# Patient Record
Sex: FEMALE | Race: BLACK OR AFRICAN AMERICAN | Employment: OTHER | URBAN - METROPOLITAN AREA
[De-identification: names, ages, dates, MRNs, and addresses within clinical notes are randomized per-mention and may not be internally consistent; named-entity substitution may affect disease eponyms.]

---

## 2017-01-11 ENCOUNTER — OFFICE VISIT (OUTPATIENT)
Dept: INTERNAL MEDICINE CLINIC | Age: 62
End: 2017-01-11

## 2017-01-11 VITALS
SYSTOLIC BLOOD PRESSURE: 126 MMHG | TEMPERATURE: 97.3 F | DIASTOLIC BLOOD PRESSURE: 75 MMHG | HEART RATE: 97 BPM | BODY MASS INDEX: 26.68 KG/M2 | RESPIRATION RATE: 18 BRPM | OXYGEN SATURATION: 98 % | WEIGHT: 145 LBS | HEIGHT: 62 IN

## 2017-01-11 DIAGNOSIS — E78.2 MIXED DYSLIPIDEMIA: ICD-10-CM

## 2017-01-11 DIAGNOSIS — Z78.9 STATIN INTOLERANCE: ICD-10-CM

## 2017-01-11 DIAGNOSIS — B37.81 THRUSH OF MOUTH AND ESOPHAGUS (HCC): Primary | ICD-10-CM

## 2017-01-11 DIAGNOSIS — M25.521 ELBOW PAIN, RIGHT: ICD-10-CM

## 2017-01-11 DIAGNOSIS — Z87.891 FORMER SMOKER: ICD-10-CM

## 2017-01-11 DIAGNOSIS — I10 BENIGN ESSENTIAL HTN: ICD-10-CM
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
## 2017-01-11 DIAGNOSIS — B37.0 THRUSH OF MOUTH AND ESOPHAGUS (HCC): Primary | ICD-10-CM

## 2017-01-11 DIAGNOSIS — F43.9 STRESS AT HOME: ICD-10-CM

## 2017-01-11 RX ORDER — NYSTATIN 100000 [USP'U]/ML
SUSPENSION ORAL
Refills: 0 | COMMUNITY
Start: 2016-12-29 | End: 2017-01-26 | Stop reason: ALTCHOICE

## 2017-01-11 RX ORDER — FLUCONAZOLE 150 MG/1
150 TABLET ORAL DAILY
Qty: 5 TAB | Refills: 0 | Status: SHIPPED | OUTPATIENT
Start: 2017-01-11 | End: 2017-01-12

## 2017-01-11 NOTE — MR AVS SNAPSHOT
Visit Information Date & Time Provider Department Dept. Phone Encounter #  
 1/11/2017 10:45  [facility] Eakly, MD [address] [id number] Follow-up Instructions Return for 1-2 weeks review labs ? praulent rx f/up esophagus 15 min please . Upcoming Health Maintenance Date Due Hepatitis C Screening 1955 Pneumococcal 19-64 Medium Risk (1 of 1 - PPSV23) 9/29/1976 FOBT Q 1 YEAR AGE 50-75 9/29/2007 BREAST CANCER SCRN MAMMOGRAM 6/3/2011 INFLUENZA AGE 9 TO ADULT 8/1/2016 PAP AKA CERVICAL CYTOLOGY 8/4/2019 DTaP/Tdap/Td series (2 - Td) 8/2/2025 Allergies as of 1/11/2017  Review Complete On: 1/11/2017 By: [address] [facility] E[facility], MD  
  
 Severity Noted Reaction Type Reactions Atorvastatin  08/02/2016    Other (comments) Severe myalgias Livalo [Pitavastatin]  01/11/2017    Rash All over body throat felt full Mobic [Meloxicam]  08/02/2016    Nausea and Vomiting Dizziness Current Immunizations  Reviewed on 8/2/2016 Name Date Tdap 8/2/2015 Not reviewed this visit You Were Diagnosed With   
  
 Codes Comments Thrush of mouth and esophagus ([facility] [address]    -  Primary ICD-10-CM: B37.81, B37.0 ICD-9-CM: 112.84, 112.0 Elbow pain, right     ICD-10-CM: M25.521 ICD-9-CM: 719.42 Fall, initial encounter     ICD-10-CM: W19. Gilmar Farnsworth ICD-9-CM: E888.9 Stress at home     ICD-10-CM: F43.9 ICD-9-CM: V61.9 Former smoker     ICD-10-CM: Z04.637 ICD-9-CM: V15.82 Benign essential HTN     ICD-10-CM: I10 
ICD-9-CM: 401.1 Statin intolerance     ICD-10-CM: Z78.9 ICD-9-CM: 995.27 Mixed dyslipidemia     ICD-10-CM: E78.2 ICD-9-CM: 272.2 Vitals BP Pulse Temp Resp Height(growth percentile) Weight(growth percentile) 126/75 (BP 1 Location: Left arm, BP Patient Position: Sitting) 97 97.3 °F (36.3 °C) (Oral) 18 5' 2\" (1.575 m) 145 lb (65.8 kg) SpO2 BMI OB Status Smoking Status 98% 26.52 kg/m2 Postmenopausal Former Smoker Vitals History BMI and BSA Data Body Mass Index Body Surface Area  
 26.52 kg/m 2 1.7 m 2 Preferred Pharmacy Pharmacy Name Phone [facility]PHARMACY #[id number] [address] [facility] [phone] Your Updated Medication List  
  
   
This list is accurate as of: 1/11/17 11:42 AM.  Always use your most recent med list. amLODIPine 10 mg tablet Commonly known as:  Ho Arley TAKE 1 TABLET BY MOUTH DAILY  
  
 cetirizine 10 mg tablet Commonly known as:  ZYRTEC Take  by mouth.  
  
 cloNIDine HCl 0.1 mg tablet Commonly known as:  CATAPRES Take 1 Tab by mouth nightly. Indications: VASOMOTOR SYMPTOMS ASSOCIATED WITH MENOPAUSE  
  
 diazePAM 5 mg tablet Commonly known as:  VALIUM Take 5 mg by mouth every six (6) hours as needed for Anxiety. fluconazole 150 mg tablet Commonly known as:  DIFLUCAN Take 1 Tab by mouth daily for 1 day. FDA advises cautious prescribing of oral fluconazole in pregnancy. HYDROcodone-ibuprofen 7.5-200 mg per tablet Commonly known as:  Yessi Lisseth Take  by mouth.  
  
 lovastatin 10 mg tablet Commonly known as:  MEVACOR Take 1 Tab by mouth nightly. meloxicam 15 mg tablet Commonly known as:  MOBIC  
  
 methocarbamol 750 mg tablet Commonly known as:  ROBAXIN  
  
 mometasone 50 mcg/actuation nasal spray Commonly known as:  NASONEX  
2 Sprays by Both Nostrils route daily. nystatin 100,000 unit/mL suspension Commonly known as:  MYCOSTATIN  
TK 5 ML PO FOUR TIMES DAILY FOR 7 DAYS  
  
 VITAMIN D2 50,000 unit capsule Generic drug:  ergocalciferol Take 50,000 Units by mouth. 2 capsules once a week VITAMIN D3 5,000 unit Tab tablet Generic drug:  cholecalciferol (VITAMIN D3) Take  by mouth daily. Prescriptions Sent to Pharmacy Refills fluconazole (DIFLUCAN) 150 mg tablet 0 Sig: Take 1 Tab by mouth daily for 1 day. FDA advises cautious prescribing of oral fluconazole in pregnancy. Class: Normal  
 Pharmacy: [facility]/pharmacy #[id number] [id number]  [facility] AT [address] [place] [address] #: [phone] Route: Oral  
  
We Performed the Following LDL, DIRECT D2300824 CPT(R)] REFERRAL TO GASTROENTEROLOGY [RDS40 Custom] Comments:  
 Please evaluate patient for candidiasis of the esophagus Follow-up Instructions Return for 1-2 weeks review labs ? praulent rx f/up esophagus 15 min please . Referral Information Referral ID Referred By Referred To  
  
 [id number] Julien Morocho, [address] [facility] [address] [place], [address] [facility] Visits Status Start Date End Date 1 New Request 1/11/17 1/11/18 If your referral has a status of pending review or denied, additional information will be sent to support the outcome of this decision. Patient Instructions What to Expect When You Call [facility]. How may we pray with you?  When you call [facility] at 1-800-NOW-PRAY, any time night or day, these are the first words you will hear. The phone will be answered by a trained prayer ministry associate who will ask for your first name and the name(s) of anyone for whom you are requesting prayer. The choice is yours whether you share these names. Rest assured, the information you provide is kept strictly confidential. 
Levon Trejo can then describe your situation and what kind of prayer you would like. You might want to ask for a prayer for healing  or prosperity  or a new job  or inner peace  or guidance  or a harmonious relationship  or to express brett and gratitude. There Are No Limits to Prayer No prayer request is too big or too small. Some people call when theyve lost a loved one; others call when theyve misplaced their keys!  Even if you are not sure what to pray for, the telephone prayer ministry associate will listen with love and compassion and then pray with you about your situation. You need only listen and take it in. After we have prayed with you we then place your prayer request in the [address] [place] [address] for 30 days of continuous prayer. The associate will offer to send you a personal letter (or email) for additional prayer support. If you say yes, you will be asked your full name and mailing address or email address. You are in no way obligated to receive a follow-up letter, but most people find it very comforting and a good reminder that you are never alone. If you have requested a prayer on behalf of someone else, you may also request that a letter of prayer support be sent to them, in which case you need to provide their mailing or email address. This prayer ministry is available to you for no charge, and you may call or contact [employer] as often as you like. Give [employer] a Call Sometimes life circumstances can feel overwhelming. Even though God is with you in every moment, it can be very comforting and reassuring to be reminded of that truth, and to hear a loving voice offer prayer support. [facility] [employer] is always here for you. What one heart cannot bear alone, many hearts can bear together.  This is [facility] [place]. How may we pray with you? Introducing [facility]! Anibal Carmen introduces [employer] patient portal. Now you can access parts of your medical record, email your doctor's office, and request medication refills online. 1. In your internet browser, go to [email or web address] 2. Click on the First Time User? Click Here link in the Sign In box. You will see the New Member Sign Up page. 3. Enter your [employer] Access Code exactly as it appears below. You will not need to use this code after youve completed the sign-up process.  If you do not sign up before the expiration date, you must request a new code. · [employer] Access Code: [email or web address] Expires: 4/11/2017 11:42 AM 
 
4. Enter the last four digits of your Social Security Number (xxxx) and Date of Birth (mm/dd/yyyy) as indicated and click Submit. You will be taken to the next sign-up page. 5. Create a [employer] ID. This will be your [employer] login ID and cannot be changed, so think of one that is secure and easy to remember. 6. Create a [employer] password. You can change your password at any time. 7. Enter your Password Reset Question and Answer. This can be used at a later time if you forget your password. 8. Enter your e-mail address. You will receive e-mail notification when new information is available in [address] 9. Click Sign Up. You can now view and download portions of your medical record. 10. Click the Download Summary menu link to download a portable copy of your medical information. If you have questions, please visit the Frequently Asked Questions section of the [employer] website. Remember, [employer] is NOT to be used for urgent needs. For medical emergencies, dial 911. Now available from your iPhone and Android! Please provide this summary of care documentation to your next provider. Your primary care clinician is listed as Larry Lugo. If you have any questions after today's visit, please call [phone].

## 2017-01-11 NOTE — PROGRESS NOTES
Chief Complaint   Patient presents with    Hypertension       Subjective:   Spenser Valenzuela 63 y.o.  female with a  past medical history reviewed see below. comes in today for f/up HTN. Pt had thrush and went to the er and felt much better but still feels like something is in her hroat and the sensation never went totally away   She was treated with nystatin but tnot a pill   She feel this am as her pipes burst and her heat went out. And she was taking a shower before they fixed her pipes and she slipped and feel on her floor and injrued her right elbow     ROS: otherwise feeling generally well. All other systems reviewed and are negative    Current Outpatient Prescriptions   Medication Sig Dispense Refill    nystatin (MYCOSTATIN) 100,000 unit/mL suspension TK 5 ML PO FOUR TIMES DAILY FOR 7 DAYS  0    amLODIPine (NORVASC) 10 mg tablet TAKE 1 TABLET BY MOUTH DAILY 30 Tab 3    cholecalciferol, VITAMIN D3, (VITAMIN D3) 5,000 unit tab tablet Take  by mouth daily.  cetirizine (ZYRTEC) 10 mg tablet Take  by mouth.  cloNIDine HCl (CATAPRES) 0.1 mg tablet Take 1 Tab by mouth nightly. Indications: VASOMOTOR SYMPTOMS ASSOCIATED WITH MENOPAUSE 30 Tab 3    mometasone (NASONEX) 50 mcg/actuation nasal spray 2 Sprays by Both Nostrils route daily. 17 Container 0    lovastatin (MEVACOR) 10 mg tablet Take 1 Tab by mouth nightly. 30 Tab 0    meloxicam (MOBIC) 15 mg tablet   5    methocarbamol (ROBAXIN) 750 mg tablet   3    VITAMIN D2 50,000 unit capsule Take 50,000 Units by mouth. 2 capsules once a week  1    HYDROcodone-ibuprofen (VICOPROFEN) 7.5-200 mg per tablet Take  by mouth.  diazepam (VALIUM) 5 mg tablet Take 5 mg by mouth every six (6) hours as needed for Anxiety.        Allergies   Allergen Reactions    Atorvastatin Other (comments)     Severe myalgias     Livalo [Pitavastatin] Rash     All over body throat felt full    Mobic [Meloxicam] Nausea and Vomiting     Dizziness      Past Medical History Diagnosis Date    Hypercholesterolemia     Hypertension     Sciatica     TMJ (dislocation of temporomandibular joint)      Past Surgical History   Procedure Laterality Date    Hx colonoscopy  2011     7 yr h/o plyps      Family History   Problem Relation Age of Onset   [address] [facility] [address] Stroke Mother     Hypertension Mother    [facility] Elevated Lipids Father     Hypertension Father     Heart Disease Father 48    Hypertension Brother      Social History   Substance Use Topics    Smoking status: Former Smoker     Quit date: 12/28/2016    Smokeless tobacco: Never Used    Alcohol use Yes      Comment: rarely          Objective:     Visit Vitals    /75 (BP 1 Location: Left arm, BP Patient Position: Sitting)    Pulse 97    Temp 97.3 °F (36.3 °C) (Oral)    Resp 18    Ht 5' 2\" (1.575 m)    Wt 145 lb (65.8 kg)    SpO2 98%    BMI 26.52 kg/m2     Gen: NAD, pleasant  HEENT: normal appearing head, nares patent, PERRLA, EOMI, oropharynx no erythema, no cervical lymphadenopathy neck supple thrush of mouth  Cardio: RRR nl S1S2 no murmur  Lungs CTAB no wheeze no rales no rhonchi  ABD Soft non tender non distended + bowel sounds  Extremities: full ROM X 4 no clubbing no cyanosis right elbow mild tenderness no concern for fracture. rom limited by pain but full   Neuro: no gross focal deficits noted, alert and orientated X 3  Psych.: well groomed no outward signs of depression. Assessment/Plan:   Constance Angel was seen today for hypertension. Diagnoses and all orders for this visit:    Valaria Carrizales of mouth and esophagus ([address]  -     REFERRAL TO GASTROENTEROLOGY    Elbow pain, right    Fall, initial encounter    Stress at home    Former smoker    Benign essential HTN  -     LDL, DIRECT    Statin intolerance  -     LDL, DIRECT    Mixed dyslipidemia  -     LDL, DIRECT    Other orders  -     fluconazole (DIFLUCAN) 150 mg tablet; Take 1 Tab by mouth daily for 1 day.  FDA advises cautious prescribing of oral fluconazole in pregnancy. Follow-up Disposition:  Return for 1-2 weeks review labs ? praulent rx f/up esophagus 15 min please . avs printed and given to the pt. The patient voiced understanding of the above. Medication side effects were reviewed with the patient. Call with any concerns.

## 2017-01-11 NOTE — PATIENT INSTRUCTIONS
What to Expect When You Call    [facility]. How may we pray with you?   When you call [facility] at 1-800-NOW-PRAY, any time night or day, these are the first words you will hear. The phone will be answered by a trained prayer ministry associate who will ask for your first name and the name(s) of anyone for whom you are requesting prayer. The choice is yours whether you share these names. Rest assured, the information you provide is kept strictly confidential.  Levon Sarahias can then describe your situation and what kind of prayer you would like. You might want to ask for a prayer for healing  or prosperity  or a new job  or inner peace  or guidance  or a harmonious relationship  or to express brett and gratitude. There Are No Limits to Prayer  No prayer request is too big or too small. Some people call when theyve lost a loved one; others call when theyve misplaced their keys! Even if you are not sure what to pray for, the telephone prayer ministry associate will listen with love and compassion and then pray with you about your situation. You need only listen and take it in. After we have prayed with you we then place your prayer request in the [address] [place] [address] for 30 days of continuous prayer. The associate will offer to send you a personal letter (or email) for additional prayer support. If you say yes, you will be asked your full name and mailing address or email address. You are in no way obligated to receive a follow-up letter, but most people find it very comforting and a good reminder that you are never alone. If you have requested a prayer on behalf of someone else, you may also request that a letter of prayer support be sent to them, in which case you need to provide their mailing or email address. This prayer ministry is available to you for no charge, and you may call or contact [employer] as often as you like.   Give [facility] a Call  Sometimes life circumstances can feel overwhelming. Even though God is with you in every moment, it can be very comforting and reassuring to be reminded of that truth, and to hear a loving voice offer prayer support. [facility] [place] is always here for you. What one heart cannot bear alone, many hearts can bear together.   This is [facility] [place]. How may we pray with you?

## 2017-01-12 LAB — LDLC SERPL DIRECT ASSAY-MCNC: 112 MG/DL (ref 0–99)

## 2017-01-26 ENCOUNTER — OFFICE VISIT (OUTPATIENT)
Dept: INTERNAL MEDICINE CLINIC | Age: 62
End: 2017-01-26

## 2017-01-26 VITALS
HEIGHT: 62 IN | BODY MASS INDEX: 26.46 KG/M2 | HEART RATE: 102 BPM | DIASTOLIC BLOOD PRESSURE: 67 MMHG | WEIGHT: 143.8 LBS | SYSTOLIC BLOOD PRESSURE: 114 MMHG | OXYGEN SATURATION: 96 % | TEMPERATURE: 98 F | RESPIRATION RATE: 14 BRPM

## 2017-01-26 DIAGNOSIS — Z71.2 ENCOUNTER TO DISCUSS TEST RESULTS: ICD-10-CM

## 2017-01-26 DIAGNOSIS — Z09 HOSPITAL DISCHARGE FOLLOW-UP: Primary | ICD-10-CM

## 2017-01-26 DIAGNOSIS — R00.0 TACHYCARDIA: ICD-10-CM

## 2017-01-26 DIAGNOSIS — F43.29 STRESS AND ADJUSTMENT REACTION: ICD-10-CM

## 2017-01-26 DIAGNOSIS — Z63.4 DEATH OF RELATIVE: ICD-10-CM

## 2017-01-26 DIAGNOSIS — Z78.9 STATIN INTOLERANCE: ICD-10-CM

## 2017-01-26 DIAGNOSIS — R93.89 ABNORMAL CT SCAN: ICD-10-CM

## 2017-01-26 RX ORDER — LORAZEPAM 1 MG/1
TABLET ORAL
Qty: 14 TAB | Refills: 0 | Status: SHIPPED | OUTPATIENT
Start: 2017-01-26

## 2017-01-26 RX ORDER — LOVASTATIN 10 MG/1
10 TABLET ORAL
Qty: 30 TAB | Refills: 3 | Status: SHIPPED | OUTPATIENT
Start: 2017-01-26 | End: 2017-02-03 | Stop reason: SDUPTHER

## 2017-01-26 SDOH — SOCIAL STABILITY - SOCIAL INSECURITY: DISSAPEARANCE AND DEATH OF FAMILY MEMBER: Z63.4

## 2017-01-26 NOTE — PROGRESS NOTES
Chief Complaint   Patient presents with    Results     follow up from last visit and labs   [facility] Follow Up     follow up from [facility] for pneumonia. Subjective:   Colonel Paez 63 y.o.  female with a  past medical history reviewed see below. Here for f/up of of hypercholesterolemia   She was recently in the er for respiratory distress it was worse dx with CAP  incidental lung nodules she stopped smoking dec 28th was smoking 10 yrs approx. 8 pack yrs  She had a ct scan lungs and abd reviewed a hepatic mass and a adrenal mass she has a mri set up for the 31st at [facility]  She has been very stressed and buried her childhood girlfriend she is not sleeping. She took the medication for the thrush and feels better no longer has a sensation in her throat     ROS: otherwise feeling generally well. All other systems reviewed and are negative    Current Outpatient Prescriptions   Medication Sig Dispense Refill    lovastatin (MEVACOR) 10 mg tablet Take 1 Tab by mouth nightly. 30 Tab 3    LORazepam (ATIVAN) 1 mg tablet Start with a 1/2 tab use up to TID as needed 14 Tab 0    amLODIPine (NORVASC) 10 mg tablet TAKE 1 TABLET BY MOUTH DAILY 30 Tab 3    cholecalciferol, VITAMIN D3, (VITAMIN D3) 5,000 unit tab tablet Take  by mouth daily.  VITAMIN D2 50,000 unit capsule Take 50,000 Units by mouth. 2 capsules once a week  1    cetirizine (ZYRTEC) 10 mg tablet Take  by mouth.  cloNIDine HCl (CATAPRES) 0.1 mg tablet Take 1 Tab by mouth nightly. Indications: VASOMOTOR SYMPTOMS ASSOCIATED WITH MENOPAUSE 30 Tab 3    mometasone (NASONEX) 50 mcg/actuation nasal spray 2 Sprays by Both Nostrils route daily. 17 Container 0    meloxicam (MOBIC) 15 mg tablet   5    methocarbamol (ROBAXIN) 750 mg tablet   3    HYDROcodone-ibuprofen (VICOPROFEN) 7.5-200 mg per tablet Take  by mouth.        Allergies   Allergen Reactions    Atorvastatin Other (comments)     Severe myalgias     Livalo [Pitavastatin] Rash     All over body throat felt full    Mobic [Meloxicam] Nausea and Vomiting     Dizziness      Past Medical History   Diagnosis Date    Hypercholesterolemia     Hypertension     Sciatica     TMJ (dislocation of temporomandibular joint)      Past Surgical History   Procedure Laterality Date    Hx colonoscopy  2011     7 yr h/o plyps      Family History   Problem Relation Age of Onset   [place] Stroke Mother     Hypertension Mother    [employer] [place] Elevated Lipids Father     Hypertension Father     Heart Disease Father 48    Hypertension Brother      Social History   Substance Use Topics    Smoking status: Former Smoker     Quit date: 12/28/2016    Smokeless tobacco: Never Used    Alcohol use Yes      Comment: rarely       Objective:     Visit Vitals    /67 (BP 1 Location: Left arm, BP Patient Position: At rest)    Pulse (!) 102    Temp 98 °F (36.7 °C) (Oral)    Resp 14    Ht 5' 2\" (1.575 m)    Wt 143 lb 12.8 oz (65.2 kg)    SpO2 96%    BMI 26.3 kg/m2     Gen: NAD, pleasant  HEENT: normal appearing head, nares patent, PERRLA, EOMI, oropharynx no erythema, no cervical lymphadenopathy neck supple   Cardio: RRR nl S1S2 no murmur  Lungs CTAB no wheeze no rales no rhonchi  ABD Soft non tender non distended + bowel sounds  Extremities: full ROM X 4 no clubbing no cyanosis  Neuro: no gross focal deficits noted, alert and orientated X 3  Psych.: well groomed no outward signs of depression. Assessment/Plan:   Javier Acosta was seen today for results and hospital follow up. Diagnoses and all orders for this visit:    Hospital discharge follow-up    Tachycardia    Abnormal CT scan- adrenal mass hepatic mass     Stress and adjustment reaction    Encounter to discuss test results    Death of relative    Statin intolerance    Other orders  -     lovastatin (MEVACOR) 10 mg tablet; Take 1 Tab by mouth nightly.   -     LORazepam (ATIVAN) 1 mg tablet; Start with a 1/2 tab use up to TID as needed    Follow-up Disposition:  Return in about 7 days (around 2/2/2017) for review mri results 15 min . Mabeline Sink avs printed and given to the pt. .  Will have pt f/up on the 2nd for results of MCV restart statin continue to not smokeno need for praulent diet changes helped continue cv exercise   The patient voiced understanding of the above. Medication side effects were reviewed with the patient. Call with any concerns.

## 2017-01-26 NOTE — MR AVS SNAPSHOT
Visit Information Date & Time Provider Department Dept. Phone Encounter #  
 1/26/2017  2:45  [facility] [place] [phone] [id number] Follow-up Instructions Return in about 7 days (around 2/2/2017) for review mri results 15 min . Routing History Upcoming Health Maintenance Date Due Hepatitis C Screening 1955 FOBT Q 1 YEAR AGE 50-75 9/29/2007 BREAST CANCER SCRN MAMMOGRAM 6/3/2011 PAP AKA CERVICAL CYTOLOGY 8/4/2019 DTaP/Tdap/Td series (2 - Td) 8/2/2025 Allergies as of 1/26/2017  Review Complete On: 1/26/2017 By: Karmen Ramos LPN Severity Noted Reaction Type Reactions Atorvastatin  08/02/2016    Other (comments) Severe myalgias Livalo [Pitavastatin]  01/11/2017    Rash All over body throat felt full Mobic [Meloxicam]  08/02/2016    Nausea and Vomiting Dizziness Current Immunizations  Reviewed on 8/2/2016 Name Date Tdap 8/2/2015 Not reviewed this visit You Were Diagnosed With   
  
 Codes Comments Hospital discharge follow-up    -  Primary ICD-10-CM: [address] ICD-9-CM: V67.59 Tachycardia     ICD-10-CM: R00.0 ICD-9-CM: 785.0 Abnormal CT scan     ICD-10-CM: R93.8 ICD-9-CM: 793.99 Stress and adjustment reaction     ICD-10-CM: F43.29 ICD-9-CM: 309.89 Encounter to discuss test results     ICD-10-CM: Z71.89 ICD-9-CM: V65.49 Death of relative     ICD-10-CM: Z63.4 ICD-9-CM: V61.07 Statin intolerance     ICD-10-CM: Z78.9 ICD-9-CM: 995.27 Vitals BP Pulse Temp Resp Height(growth percentile) Weight(growth percentile) 114/67 (BP 1 Location: Left arm, BP Patient Position: At rest) (!) 102 98 °F (36.7 °C) (Oral) 14 5' 2\" (1.575 m) 143 lb 12.8 oz (65.2 kg) SpO2 BMI OB Status Smoking Status 96% 26.3 kg/m2 Postmenopausal Former Smoker BMI and BSA Data Body Mass Index Body Surface Area 26.3 kg/m 2 1.69 m 2 Preferred Pharmacy Pharmacy Name Phone [facility]/PHARMACY #[id number] [id number] [facility] AT [address] [place] [address] [phone] Your Updated Medication List  
  
   
This list is accurate as of: 1/26/17  3:12 PM.  Always use your most recent med list. amLODIPine 10 mg tablet Commonly known as:  Jane Rhonda TAKE 1 TABLET BY MOUTH DAILY  
  
 cetirizine 10 mg tablet Commonly known as:  ZYRTEC Take  by mouth.  
  
 cloNIDine HCl 0.1 mg tablet Commonly known as:  CATAPRES Take 1 Tab by mouth nightly. Indications: VASOMOTOR SYMPTOMS ASSOCIATED WITH MENOPAUSE HYDROcodone-ibuprofen 7.5-200 mg per tablet Commonly known as:  Linnette Duet Take  by mouth. LORazepam 1 mg tablet Commonly known as:  ATIVAN Start with a 1/2 tab use up to TID as needed  
  
 lovastatin 10 mg tablet Commonly known as:  MEVACOR Take 1 Tab by mouth nightly. meloxicam 15 mg tablet Commonly known as:  MOBIC  
  
 methocarbamol 750 mg tablet Commonly known as:  ROBAXIN  
  
 mometasone 50 mcg/actuation nasal spray Commonly known as:  NASONEX  
2 Sprays by Both Nostrils route daily. VITAMIN D2 50,000 unit capsule Generic drug:  ergocalciferol Take 50,000 Units by mouth. 2 capsules once a week VITAMIN D3 5,000 unit Tab tablet Generic drug:  cholecalciferol (VITAMIN D3) Take  by mouth daily. Prescriptions Printed Refills LORazepam (ATIVAN) 1 mg tablet 0 Sig: Start with a 1/2 tab use up to TID as needed Class: Print Prescriptions Sent to Pharmacy Refills  
 lovastatin (MEVACOR) 10 mg tablet 3 Sig: Take 1 Tab by mouth nightly. Class: Normal  
 Pharmacy: [facility]/pharmacy #[id number]- [place], [place] - [facility] AT [address] [place] [address] Ph #: [phone] Route: Oral  
  
Follow-up Instructions Return in about 7 days (around 2/2/2017) for review mri results 15 min . Patient Instructions Call with any concerns What to Expect When You Call [employer]. How may we pray with you?  When you call [employer] at 1-800-NOW-PRAY, any time night or day, these are the first words you will hear. The phone will be answered by a trained prayer ministry associate who will ask for your first name and the name(s) of anyone for whom you are requesting prayer. The choice is yours whether you share these names. Rest assured, the information you provide is kept strictly confidential. 
Mary Jo Hammer can then describe your situation and what kind of prayer you would like. You might want to ask for a prayer for healing  or prosperity  or a new job  or inner peace  or guidance  or a harmonious relationship  or to express brett and gratitude. There Are No Limits to Prayer No prayer request is too big or too small. Some people call when theyve lost a loved one; others call when theyve misplaced their keys! Even if you are not sure what to pray for, the telephone prayer ministry associate will listen with love and compassion and then pray with you about your situation. You need only listen and take it in. After we have prayed with you we then place your prayer request in the [address] [place] [address] for 30 days of continuous prayer. The associate will offer to send you a personal letter (or email) for additional prayer support. If you say yes, you will be asked your full name and mailing address or email address. You are in no way obligated to receive a follow-up letter, but most people find it very comforting and a good reminder that you are never alone. If you have requested a prayer on behalf of someone else, you may also request that a letter of prayer support be sent to them, in which case you need to provide their mailing or email address. This prayer ministry is available to you for no charge, and you may call or contact [employer] as often as you like. Give [facility] a Call Sometimes life circumstances can feel overwhelming. Even though God is with you in every moment, it can be very comforting and reassuring to be reminded of that truth, and to hear a loving voice offer prayer support. [facility] is always here for you. What one heart cannot bear alone, many hearts can bear together.  This is [employer]. How may we pray with you? Introducing [facility]! Yudith Nichole introduces [employer] patient portal. Now you can access parts of your medical record, email your doctor's office, and request medication refills online. 1. In your internet browser, go to [email or web address] 2. Click on the First Time User? Click Here link in the Sign In box. You will see the New Member Sign Up page. 3. Enter your [employer] Access Code exactly as it appears below. You will not need to use this code after youve completed the sign-up process. If you do not sign up before the expiration date, you must request a new code. · [employer] Access Code: [email or web address] Expires: 4/11/2017 11:42 AM 
 
4. Enter the last four digits of your Social Security Number (xxxx) and Date of Birth (mm/dd/yyyy) as indicated and click Submit. You will be taken to the next sign-up page. 5. Create a [employer] ID. This will be your [employer] login ID and cannot be changed, so think of one that is secure and easy to remember. 6. Create a [employer] password. You can change your password at any time. 7. Enter your Password Reset Question and Answer. This can be used at a later time if you forget your password. 8. Enter your e-mail address. You will receive e-mail notification when new information is available in [address] 9. Click Sign Up. You can now view and download portions of your medical record. 10. Click the Download Summary menu link to download a portable copy of your medical information. If you have questions, please visit the Frequently Asked Questions section of the [employer] website. Remember, [employer] is NOT to be used for urgent needs. For medical emergencies, dial 911. Now available from your iPhone and Android! Please provide this summary of care documentation to your next provider. Your primary care clinician is listed as Cole Fillers. If you have any questions after today's visit, please call [phone].

## 2017-01-26 NOTE — Clinical Note
On Thursday the 2nd of Feb  could you please print off her MRI results from [facility] will be done on the 31st of Jan

## 2017-01-26 NOTE — PATIENT INSTRUCTIONS
Call with any concerns     What to Expect When You Call    [employer]. How may we pray with you?   When you call [employer] at 1-800-NOW-PRAY, any time night or day, these are the first words you will hear. The phone will be answered by a trained prayer ministry associate who will ask for your first name and the name(s) of anyone for whom you are requesting prayer. The choice is yours whether you share these names. Rest assured, the information you provide is kept strictly confidential.  Roy Conrado can then describe your situation and what kind of prayer you would like. You might want to ask for a prayer for healing  or prosperity  or a new job  or inner peace  or guidance  or a harmonious relationship  or to express brett and gratitude. There Are No Limits to Prayer  No prayer request is too big or too small. Some people call when theyve lost a loved one; others call when theyve misplaced their keys! Even if you are not sure what to pray for, the telephone prayer ministry associate will listen with love and compassion and then pray with you about your situation. You need only listen and take it in. After we have prayed with you we then place your prayer request in the [address] [place] [address] for 30 days of continuous prayer. The associate will offer to send you a personal letter (or email) for additional prayer support. If you say yes, you will be asked your full name and mailing address or email address. You are in no way obligated to receive a follow-up letter, but most people find it very comforting and a good reminder that you are never alone. If you have requested a prayer on behalf of someone else, you may also request that a letter of prayer support be sent to them, in which case you need to provide their mailing or email address. This prayer ministry is available to you for no charge, and you may call or contact [employer] as often as you like.   Give [employer] a Call  Sometimes life circumstances can feel overwhelming. Even though God is with you in every moment, it can be very comforting and reassuring to be reminded of that truth, and to hear a loving voice offer prayer support. [facility] is always here for you. What one heart cannot bear alone, many hearts can bear together.   This is [employer]. How may we pray with you?

## 2017-01-26 NOTE — PROGRESS NOTES
1. Have you been to the ER, urgent care clinic since your last visit? Hospitalized since your last visit? Yes see below. 2. Have you seen or consulted any other health care providers outside of the [address] [place] [address] since your last visit? Include any pap smears or colon screening. No     Chief Complaint   Patient presents with    Results     follow up from last visit and labs   [facility] Follow Up     follow up from [facility] for pneumonia.       Not fasting

## 2017-02-03 ENCOUNTER — OFFICE VISIT (OUTPATIENT)
Dept: INTERNAL MEDICINE CLINIC | Age: 62
End: 2017-02-03

## 2017-02-03 VITALS
TEMPERATURE: 97.8 F | SYSTOLIC BLOOD PRESSURE: 118 MMHG | RESPIRATION RATE: 14 BRPM | HEIGHT: 62 IN | DIASTOLIC BLOOD PRESSURE: 78 MMHG | HEART RATE: 99 BPM | WEIGHT: 143 LBS | OXYGEN SATURATION: 88 % | BODY MASS INDEX: 26.31 KG/M2

## 2017-02-03 DIAGNOSIS — Z87.09 HISTORY OF EMPHYSEMA: ICD-10-CM

## 2017-02-03 DIAGNOSIS — R06.02 SOB (SHORTNESS OF BREATH): Primary | ICD-10-CM

## 2017-02-03 DIAGNOSIS — Z78.9 STATIN INTOLERANCE: ICD-10-CM

## 2017-02-03 DIAGNOSIS — D35.02 ADRENAL ADENOMA, LEFT: ICD-10-CM

## 2017-02-03 DIAGNOSIS — Z71.2 ENCOUNTER TO DISCUSS TEST RESULTS: ICD-10-CM

## 2017-02-03 DIAGNOSIS — E78.2 MIXED DYSLIPIDEMIA: ICD-10-CM

## 2017-02-03 PROBLEM — D35.00 ADRENAL ADENOMA: Status: ACTIVE | Noted: 2017-02-03

## 2017-02-03 LAB — SPIROMETRY INTERPRETATION, SPITPOCT: NORMAL

## 2017-02-03 RX ORDER — LEVALBUTEROL TARTRATE 45 UG/1
2 AEROSOL, METERED ORAL
Qty: 1 INHALER | Refills: 0 | Status: SHIPPED | OUTPATIENT
Start: 2017-02-03

## 2017-02-03 RX ORDER — LOVASTATIN 10 MG/1
20 TABLET ORAL
Qty: 30 TAB | Refills: 3
Start: 2017-02-03

## 2017-02-03 NOTE — PROGRESS NOTES
Chief Complaint   Patient presents with    Results     testing results           Subjective:   Shayy Lauren 63 y.o.  female with a  past medical history reviewed see below. comes in today for test results   She has decreased butter and and eating more fruits and veggies and stopped using house fat to cook. 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 ROS: otherwise feeling generally well. All other systems reviewed and are negative      Current Outpatient Prescriptions   Medication Sig Dispense Refill    lovastatin (MEVACOR) 10 mg tablet Take 1 Tab by mouth nightly. 30 Tab 3    LORazepam (ATIVAN) 1 mg tablet Start with a 1/2 tab use up to TID as needed 14 Tab 0    amLODIPine (NORVASC) 10 mg tablet TAKE 1 TABLET BY MOUTH DAILY 30 Tab 3    cloNIDine HCl (CATAPRES) 0.1 mg tablet Take 1 Tab by mouth nightly. Indications: VASOMOTOR SYMPTOMS ASSOCIATED WITH MENOPAUSE 30 Tab 3    cholecalciferol, VITAMIN D3, (VITAMIN D3) 5,000 unit tab tablet Take  by mouth daily.  mometasone (NASONEX) 50 mcg/actuation nasal spray 2 Sprays by Both Nostrils route daily. 17 Container 0    meloxicam (MOBIC) 15 mg tablet   5    methocarbamol (ROBAXIN) 750 mg tablet   3    VITAMIN D2 50,000 unit capsule Take 50,000 Units by mouth. 2 capsules once a week  1    HYDROcodone-ibuprofen (VICOPROFEN) 7.5-200 mg per tablet Take  by mouth.  cetirizine (ZYRTEC) 10 mg tablet Take  by mouth.        Allergies   Allergen Reactions    Atorvastatin Other (comments)     Severe myalgias     Livalo [Pitavastatin] Rash     All over body throat felt full    Mobic [Meloxicam] Nausea and Vomiting     Dizziness      Past Medical History   Diagnosis Date    Hypercholesterolemia     Hypertension     Sciatica     TMJ (dislocation of temporomandibular joint)      Past Surgical History   Procedure Laterality Date    Hx colonoscopy  2011     7 yr h/o plyps      Family History   Problem Relation Age of Onset   [facility] Stroke Mother     Hypertension Mother     Elevated Lipids Father     Hypertension Father     Heart Disease Father 48    Hypertension Brother      Social History   Substance Use Topics    Smoking status: Former Smoker     Quit date: 12/28/2016    Smokeless tobacco: Never Used    Alcohol use Yes      Comment: rarely          Objective:     Visit Vitals    /78    Pulse 99    Temp 97.8 °F (36.6 °C) (Oral)    Resp 14    Ht 5' 2\" (1.575 m)    Wt 143 lb (64.9 kg)    SpO2 (!) 88%    BMI 26.16 kg/m2     Gen: NAD, pleasant  HEENT: normal appearing head, nares patent, PERRLA, EOMI, oropharynx no erythema, no cervical lymphadenopathy neck supple   Cardio: RRR nl S1S2 no murmur  Lungs  diminsihed bs faint wheeze no rales no rhonchi  ABD Soft non tender non distended + bowel sounds  Extremities: full ROM X 4 no clubbing no cyanosis  Neuro: no gross focal deficits noted, alert and orientated X 3  Psych.: well groomed no outward signs of depression. Assessment/Plan:   Anabell Hicks was seen today for results. Diagnoses and all orders for this visit:    SOB (shortness of breath)  -     AMB POC SPIROMETRY REVIEW/INTERP    Mixed dyslipidemia    Statin intolerance    Adrenal adenoma, left    History of emphysema  -     AMB POC SPIROMETRY REVIEW/INTERP    Encounter to discuss test results    Other orders  -     lovastatin (MEVACOR) 10 mg tablet; Take 2 Tabs by mouth nightly. Dose increased 2/3/17 did not send in a new rx  -     levalbuterol tartrate (XOPENEX) 45 mcg/actuation inhaler; Take 2 Puffs by inhalation every four (4) hours as needed for Wheezing. Follow-up Disposition:  Return in about 3 months (around 5/3/2017) for recheck chol order ct scan  15 min . Benoit hughess printed and given to the pt. .  Pt has copd likely from asthma d/w pt need to inhaler if used >4-6 times a week will need to start control will try xopenex as she had side effects from the albuterol while hospitalized! conitnue statin tolerating this dose well will try to increase and if tolerated pt to let me know so I can send in a new rx for 20 mg   The patient voiced understanding of the above. Medication side effects were reviewed with the patient. Call with any concerns.

## 2017-02-03 NOTE — PATIENT INSTRUCTIONS
Please increase your mevacor to 20 mg  (take 2 of the current supply) if you start to have muscle aches hold for 2-3 days and then restart at 10 mg and let me know ibefore you refill your rx and I will send in a new rx for 20 mg if you able to tolerate it    MyChart Activation    Thank you for requesting access to [address] Please follow the instructions below to securely access and download your online medical record. [employer] allows you to send messages to your doctor, view your test results, renew your prescriptions, schedule appointments, and more. How Do I Sign Up? 1. In your internet browser, go to [email or web address]  2. Click on the First Time User? Click Here link in the Sign In box. You will be redirect to the New Member Sign Up page. 3. Enter your [employer] Access Code exactly as it appears below. You will not need to use this code after youve completed the sign-up process. If you do not sign up before the expiration date, you must request a new code. [employer] Access Code: [email or web address]  Expires: 2017 11:42 AM (This is the date your [employer] access code will )    4. Enter the last four digits of your Social Security Number (xxxx) and Date of Birth (mm/dd/yyyy) as indicated and click Submit. You will be taken to the next sign-up page. 5. Create a [employer] ID. This will be your [employer] login ID and cannot be changed, so think of one that is secure and easy to remember. 6. Create a [employer] password. You can change your password at any time. 7. Enter your Password Reset Question and Answer. This can be used at a later time if you forget your password. 8. Enter your e-mail address. You will receive e-mail notification when new information is available in [address] 9. Click Sign Up. You can now view and download portions of your medical record. 10. Click the Download Summary menu link to download a portable copy of your medical information.     Additional Information    If you have questions, please visit the Frequently Asked Questions section of the MyChart website at [email or web address] Remember, MyChart is NOT to be used for urgent needs. For medical emergencies, dial 911. Mediterranean Diet: Care Instructions  Your Care Instructions  The Mediterranean diet features foods eaten in [place], [place], [place] and [place], and other countries that border the [facility]. It emphasizes eating a diet rich in fruits, vegetables, nuts, and high-fiber grains, and limits meat, cheese, and sweets. The Mediterranean diet may:  · Prevent heart disease and lower the risk of a heart attack or stroke. · Prevent type 2 diabetes. · Prevent Alzheimer's disease and other dementia. · Prevent depression. · Prevent Parkinson's disease. This diet contains more fat than other heart-healthy diets. But the fats are mainly from nuts, unsaturated oils, such as fish oils, olive oil, and certain nut or seed oils (such as canola, soybean, or flaxseed oil). These types of oils may help protect the heart and blood vessels. Follow-up care is a key part of your treatment and safety. Be sure to make and go to all appointments, and call your doctor if you are having problems. It's also a good idea to know your test results and keep a list of the medicines you take. How can you care for yourself at home? What to eat  · Eat a variety of fruits and vegetables each day, such as grapes, blueberries, tomatoes, broccoli, peppers, figs, olives, spinach, eggplant, beans, lentils, and chickpeas. · Eat a variety of whole-grain foods each day, such as oats, brown rice, and whole wheat bread, pasta, and couscous. · Eat fish at least 2 times a week. Try tuna, salmon, mackerel, lake trout, herring, or sardines. · Eat moderate amounts of low-fat dairy products each day or weekly, such as milk, cheese, or yogurt. · Eat moderate amounts of poultry and eggs every 2 days or weekly.   · Choose healthy (unsaturated) fats, such as nuts, olive oil and certain nut or seed oils like canola, soybean, and flaxseed. · Limit unhealthy (saturated) fats, such as butter, palm oil, and coconut oil. And limit fats found in animal products, such as meat and dairy products made with whole milk. Try to eat red meat only a few times a month in very small amounts. · Limit sweets and desserts to only a few times a week. This includes sugar-sweetened drinks like soda. The Mediterranean diet may also include red wine with your meal--1 glass each day for women and up to 2 glasses a day for men. Tips for changing your diet  · Dip bread in a mix of olive oil and fresh herbs instead of using butter. · Add avocado slices to your sandwich instead of house. · Have fish for lunch or dinner instead of red meat. Brush the fish with olive oil, and broil or grill it. · Sprinkle your salad with seeds or nuts instead of cheese. · Cook with olive or canola oil instead of butter or oils that are high in saturated fat. · Switch from 2% milk or whole milk to 1% or fat-free milk. · Dip raw vegetables in a vinaigrette dressing or hummus instead of dips made from mayonnaise or sour cream.  · Have a piece of fruit for dessert instead of a piece of cake. Try baked apples, or have some dried fruit. Part of the Mediterranean diet is being active. Get at least 30 minutes of exercise on most days of the week. Walking is a good choice. You also may want to do other activities, such as running, swimming, cycling, or playing tennis or team sports. Where can you learn more? Go to http://singh-chantelle.info/. Enter O407 in the search box to learn more about \"Mediterranean Diet: Care Instructions. \"  Current as of: July 26, 2016  Content Version: 11.1  © 3316-2042 [employer]. Care instructions adapted under license by [employer] (which disclaims liability or warranty for this information).  If you have questions about a medical condition or this instruction, always ask your healthcare professional. [place] [address] any warranty or liability for your use of this information.

## 2017-02-03 NOTE — MR AVS SNAPSHOT
Visit Information Date & Time Provider Department Dept. Phone Encounter #  
 2/3/2017 10:00  [facility] [place], [facility] [phone] [id number] Follow-up Instructions Return in about 3 months (around 5/3/2017) for recheck chol order ct scan  15 min . Upcoming Health Maintenance Date Due Hepatitis C Screening 1955 FOBT Q 1 YEAR AGE 50-75 9/29/2007 BREAST CANCER SCRN MAMMOGRAM 6/3/2011 PAP AKA CERVICAL CYTOLOGY 8/4/2019 DTaP/Tdap/Td series (2 - Td) 8/2/2025 Allergies as of 2/3/2017  Review Complete On: 2/3/2017 By: [address] [facility] [place], MD  
  
 Severity Noted Reaction Type Reactions Atorvastatin  08/02/2016    Other (comments) Severe myalgias Livalo [Pitavastatin]  01/11/2017    Rash All over body throat felt full Mobic [Meloxicam]  08/02/2016    Nausea and Vomiting Dizziness Current Immunizations  Reviewed on 8/2/2016 Name Date Tdap 8/2/2015 Not reviewed this visit You Were Diagnosed With   
  
 Codes Comments SOB (shortness of breath)    -  Primary ICD-10-CM: R06.02 
ICD-9-CM: 786.05 Mixed dyslipidemia     ICD-10-CM: E78.2 ICD-9-CM: 272.2 Statin intolerance     ICD-10-CM: Z78.9 ICD-9-CM: 995.27 Adrenal adenoma, left     ICD-10-CM: D35.02 
ICD-9-CM: 227.0 History of emphysema     ICD-10-CM: Z87.09 
ICD-9-CM: V12.69 Encounter to discuss test results     ICD-10-CM: Z71.89 ICD-9-CM: V65.49 Vitals BP Pulse Temp Resp Height(growth percentile) Weight(growth percentile) 118/78 99 97.8 °F (36.6 °C) (Oral) 14 5' 2\" (1.575 m) 143 lb (64.9 kg) SpO2 BMI OB Status Smoking Status (!) 88% 26.16 kg/m2 Postmenopausal Former Smoker Vitals History BMI and BSA Data Body Mass Index Body Surface Area  
 26.16 kg/m 2 1.68 m 2 Preferred Pharmacy Pharmacy Name Phone [facility]/PHARMACY #[id number], [place] - [address] [place] [address] [facility] AT [address] [facility] [phone] Your Updated Medication List  
  
   
This list is accurate as of: 2/3/17 11:22 AM.  Always use your most recent med list. amLODIPine 10 mg tablet Commonly known as:  Jane Rhonda TAKE 1 TABLET BY MOUTH DAILY  
  
 cetirizine 10 mg tablet Commonly known as:  ZYRTEC Take  by mouth.  
  
 cloNIDine HCl 0.1 mg tablet Commonly known as:  CATAPRES Take 1 Tab by mouth nightly. Indications: VASOMOTOR SYMPTOMS ASSOCIATED WITH MENOPAUSE HYDROcodone-ibuprofen 7.5-200 mg per tablet Commonly known as:  Linnette Duet Take  by mouth. LORazepam 1 mg tablet Commonly known as:  ATIVAN Start with a 1/2 tab use up to TID as needed  
  
 lovastatin 10 mg tablet Commonly known as:  MEVACOR Take 2 Tabs by mouth nightly. Dose increased 2/3/17 did not send in a new rx  
  
 meloxicam 15 mg tablet Commonly known as:  MOBIC  
  
 methocarbamol 750 mg tablet Commonly known as:  ROBAXIN  
  
 mometasone 50 mcg/actuation nasal spray Commonly known as:  NASONEX  
2 Sprays by Both Nostrils route daily. VITAMIN D2 50,000 unit capsule Generic drug:  ergocalciferol Take 50,000 Units by mouth. 2 capsules once a week VITAMIN D3 5,000 unit Tab tablet Generic drug:  cholecalciferol (VITAMIN D3) Take  by mouth daily. We Performed the Following AMB POC SPIROMETRY REVIEW/INTERP [id number] CPT(R)] Follow-up Instructions Return in about 3 months (around 5/3/2017) for recheck chol order ct scan  15 min . Patient Instructions Please increase your mevacor to 20 mg  (take 2 of the current supply) if you start to have muscle aches hold for 2-3 days and then restart at 10 mg and let me know ibefore you refill your rx and I will send in a new rx for 20 mg if you able to tolerate it MyChart Activation Thank you for requesting access to Cinchcast. Please follow the instructions below to securely access and download your online medical record. Cinchcast allows you to send messages to your doctor, view your test results, renew your prescriptions, schedule appointments, and more. How Do I Sign Up? 1. In your internet browser, go to [email or web address] 
2. Click on the First Time User? Click Here link in the Sign In box. You will be redirect to the New Member Sign Up page. 3. Enter your Cinchcast Access Code exactly as it appears below. You will not need to use this code after youve completed the sign-up process. If you do not sign up before the expiration date, you must request a new code. Cinchcast Access Code: [email or web address] Expires: 2017 11:42 AM (This is the date your Cinchcast access code will ) 4. Enter the last four digits of your Social Security Number (xxxx) and Date of Birth (mm/dd/yyyy) as indicated and click Submit. You will be taken to the next sign-up page. 5. Create a Cinchcast ID. This will be your Cinchcast login ID and cannot be changed, so think of one that is secure and easy to remember. 6. Create a Cinchcast password. You can change your password at any time. 7. Enter your Password Reset Question and Answer. This can be used at a later time if you forget your password. 8. Enter your e-mail address. You will receive e-mail notification when new information is available in [address] 9. Click Sign Up. You can now view and download portions of your medical record. 10. Click the Download Summary menu link to download a portable copy of your medical information. Additional Information If you have questions, please visit the Frequently Asked Questions section of the Cinchcast website at [email or web address] Vibrant Media. Machine Perception Technologies/Skyline Financialhart/. Remember, Cinchcast is NOT to be used for urgent needs. For medical emergencies, dial 911. Mediterranean Diet: Care Instructions Your Care Instructions The Mediterranean diet features foods eaten in [place], [place], [place] and [place], and other countries that border the [facility]. It emphasizes eating a diet rich in fruits, vegetables, nuts, and high-fiber grains, and limits meat, cheese, and sweets. The Mediterranean diet may: · Prevent heart disease and lower the risk of a heart attack or stroke. · Prevent type 2 diabetes. · Prevent Alzheimer's disease and other dementia. · Prevent depression. · Prevent Parkinson's disease. This diet contains more fat than other heart-healthy diets. But the fats are mainly from nuts, unsaturated oils, such as fish oils, olive oil, and certain nut or seed oils (such as canola, soybean, or flaxseed oil). These types of oils may help protect the heart and blood vessels. Follow-up care is a key part of your treatment and safety. Be sure to make and go to all appointments, and call your doctor if you are having problems. It's also a good idea to know your test results and keep a list of the medicines you take. How can you care for yourself at home? What to eat · Eat a variety of fruits and vegetables each day, such as grapes, blueberries, tomatoes, broccoli, peppers, figs, olives, spinach, eggplant, beans, lentils, and chickpeas. · Eat a variety of whole-grain foods each day, such as oats, brown rice, and whole wheat bread, pasta, and couscous. · Eat fish at least 2 times a week. Try tuna, salmon, mackerel, lake trout, herring, or sardines. · Eat moderate amounts of low-fat dairy products each day or weekly, such as milk, cheese, or yogurt. · Eat moderate amounts of poultry and eggs every 2 days or weekly. · Choose healthy (unsaturated) fats, such as nuts, olive oil and certain nut or seed oils like canola, soybean, and flaxseed. · Limit unhealthy (saturated) fats, such as butter, palm oil, and coconut oil.  And limit fats found in animal products, such as meat and dairy products made with whole milk. Try to eat red meat only a few times a month in very small amounts. · Limit sweets and desserts to only a few times a week. This includes sugar-sweetened drinks like soda. The Mediterranean diet may also include red wine with your meal1 glass each day for women and up to 2 glasses a day for men. Tips for changing your diet · Dip bread in a mix of olive oil and fresh herbs instead of using butter. · Add avocado slices to your sandwich instead of house. · Have fish for lunch or dinner instead of red meat. Brush the fish with olive oil, and broil or grill it. · Sprinkle your salad with seeds or nuts instead of cheese. · Cook with olive or canola oil instead of butter or oils that are high in saturated fat. · Switch from 2% milk or whole milk to 1% or fat-free milk. · Dip raw vegetables in a vinaigrette dressing or hummus instead of dips made from mayonnaise or sour cream. 
· Have a piece of fruit for dessert instead of a piece of cake. Try baked apples, or have some dried fruit. Part of the Mediterranean diet is being active. Get at least 30 minutes of exercise on most days of the week. Walking is a good choice. You also may want to do other activities, such as running, swimming, cycling, or playing tennis or team sports. Where can you learn more? Go to http://singh-chantelle.info/. Enter O407 in the search box to learn more about \"Mediterranean Diet: Care Instructions. \" Current as of: July 26, 2016 Content Version: 11.1 © 1130-2520 Healthwise, Incorporated. Care instructions adapted under license by MediaSpike (which disclaims liability or warranty for this information). If you have questions about a medical condition or this instruction, always ask your healthcare professional. [address] any warranty or liability for your use of this information. Introducing [facility] & HEALTH SERVICES! [facility] introduces [employer] patient portal. Now you can access parts of your medical record, email your doctor's office, and request medication refills online. 1. In your internet browser, go to [email or web address] 2. Click on the First Time User? Click Here link in the Sign In box. You will see the New Member Sign Up page. 3. Enter your Loc[employer] Access Code exactly as it appears below. You will not need to use this code after youve completed the sign-up process. If you do not sign up before the expiration date, you must request a new code. · [employer] Access Code: [email or web address] Expires: 4/11/2017 11:42 AM 
 
4. Enter the last four digits of your Social Security Number (xxxx) and Date of Birth (mm/dd/yyyy) as indicated and click Submit. You will be taken to the next sign-up page. 5. Create a [employer]Box Labs ID. This will be your [employer] login ID and cannot be changed, so think of one that is secure and easy to remember. 6. Create a [employer] password. You can change your password at any time. 7. Enter your Password Reset Question and Answer. This can be used at a later time if you forget your password. 8. Enter your e-mail address. You will receive e-mail notification when new information is available in [address] 9. Click Sign Up. You can now view and download portions of your medical record. 10. Click the Download Summary menu link to download a portable copy of your medical information. If you have questions, please visit the Frequently Asked Questions section of the LocBox Labs website. Remember, [employer] is NOT to be used for urgent needs. For medical emergencies, dial 911. Now available from your iPhone and Android! Please provide this summary of care documentation to your next provider. Your primary care clinician is listed as Kira Valadez. If you have any questions after today's visit, please call [phone].

## 2017-05-05 ENCOUNTER — HOSPITAL ENCOUNTER (OUTPATIENT)
Dept: MAMMOGRAPHY | Age: 62
Discharge: HOME | End: 2017-05-05
Attending: INTERNAL MEDICINE
Payer: COMMERCIAL

## 2017-05-05 DIAGNOSIS — Z12.31 VISIT FOR SCREENING MAMMOGRAM: ICD-10-CM

## 2017-05-05 PROCEDURE — 77067 SCR MAMMO BI INCL CAD: CPT

## 2017-05-10 RX ORDER — AMLODIPINE BESYLATE 10 MG/1
TABLET ORAL
Qty: 30 TAB | Refills: 2 | Status: SHIPPED | OUTPATIENT
Start: 2017-05-10

## 2017-08-02 ENCOUNTER — TELEPHONE (OUTPATIENT)
Dept: INTERNAL MEDICINE CLINIC | Age: 62
End: 2017-08-02

## 2017-08-02 NOTE — TELEPHONE ENCOUNTER
I called and informed pt that per Dr. Nikita Tellez, she can come in for an appt on tomorrow 08/03/17 at 12p. If her symptoms/pain gets worse, she will need to go to the ED. Pt verbalized understanding with no further questions or concerns.

## 2017-08-02 NOTE — TELEPHONE ENCOUNTER
I returned pt's call. Pt is having c/o severe nausea, decrease appetite, diarrhea, sweats. Pt intermittently experiences pain that starts in the upper right quadrant of her abd, which radiates to her back. Pt states that symptoms have been going on since the beginning of 2017 but has been at its worse for the past 2 weeks. Pt had gone to the hospital earlier this year and was dx with diverticulitis and GERD. She has cut out fired foods, green veggies, coffee, and cigarettes from her diet. She has taken Omeprazole but states that med does not help her symptoms. Pt also has been drinking katia tea. I informed pt that Dr. Prakash Bales does not have any available appts until next week but will give her the msg and give pt a callback with further instructions. Verbalization was understood with no further questions or concerns.

## 2017-08-03 ENCOUNTER — OFFICE VISIT (OUTPATIENT)
Dept: INTERNAL MEDICINE CLINIC | Age: 62
End: 2017-08-03

## 2017-08-03 VITALS
DIASTOLIC BLOOD PRESSURE: 78 MMHG | SYSTOLIC BLOOD PRESSURE: 133 MMHG | TEMPERATURE: 97.8 F | WEIGHT: 142.2 LBS | RESPIRATION RATE: 16 BRPM | OXYGEN SATURATION: 98 % | HEART RATE: 79 BPM | BODY MASS INDEX: 26.17 KG/M2 | HEIGHT: 62 IN

## 2017-08-03 DIAGNOSIS — R10.84 ABDOMINAL PAIN, GENERALIZED: Primary | ICD-10-CM

## 2017-08-03 DIAGNOSIS — Z11.59 NEED FOR HEPATITIS B SCREENING TEST: ICD-10-CM

## 2017-08-03 DIAGNOSIS — R93.5 ABNORMAL MRI OF ABDOMEN: ICD-10-CM

## 2017-08-03 DIAGNOSIS — Z11.59 NEED FOR HEPATITIS C SCREENING TEST: ICD-10-CM

## 2017-08-03 DIAGNOSIS — T50.905A MEDICATION REACTION, INITIAL ENCOUNTER: ICD-10-CM

## 2017-08-03 DIAGNOSIS — R10.9 RIGHT FLANK PAIN: ICD-10-CM

## 2017-08-03 DIAGNOSIS — K63.5 BENIGN COLON POLYP: ICD-10-CM

## 2017-08-03 RX ORDER — OMEPRAZOLE 20 MG/1
CAPSULE, DELAYED RELEASE ORAL
Refills: 0 | COMMUNITY
Start: 2017-05-30

## 2017-08-03 RX ORDER — TRAMADOL HYDROCHLORIDE 50 MG/1
TABLET ORAL
Refills: 2 | COMMUNITY
Start: 2017-06-30

## 2017-08-03 RX ORDER — ROSUVASTATIN CALCIUM 5 MG/1
TABLET, COATED ORAL
Refills: 3 | COMMUNITY
Start: 2017-07-17

## 2017-08-03 NOTE — Clinical Note
Please call [facility] to set up her mri she has a study already set up on the 23rd of aug need to change to a mri of abd and pelvis please needs asap - please call pt with details [phone] and ok to leave a message this is her home number

## 2017-08-03 NOTE — Clinical Note
Please see me about this pt need reports from [facility] since her last appt here had  A colonoscopy egd ct scan etc thanks for your help!

## 2017-08-03 NOTE — MR AVS SNAPSHOT
Visit Information Date & Time Provider Department Dept. Phone Encounter #  
 8/3/2017 12:00 PM Ofelia [facility], [facility] [phone] [id number] Follow-up Instructions Return in about 3 weeks (around 8/24/2017) for after mri 15 min review test results and recheck bp 15 min . Routing History Upcoming Health Maintenance Date Due Hepatitis C Screening 1955 FOBT Q 1 YEAR AGE 50-75 9/29/2007 ZOSTER VACCINE AGE 60> 7/29/2017 INFLUENZA AGE 9 TO ADULT 8/1/2017 BREAST CANCER SCRN MAMMOGRAM 5/5/2019 PAP AKA CERVICAL CYTOLOGY 8/4/2019 DTaP/Tdap/Td series (2 - Td) 8/2/2025 Allergies as of 8/3/2017  Review Complete On: 8/3/2017 By: Quirino Coon Severity Noted Reaction Type Reactions Atorvastatin  08/02/2016    Other (comments) Severe myalgias Livalo [Pitavastatin]  01/11/2017    Rash All over body throat felt full Mobic [Meloxicam]  08/02/2016    Nausea and Vomiting Dizziness Current Immunizations  Reviewed on 8/2/2016 Name Date Tdap 8/2/2015 Not reviewed this visit You Were Diagnosed With   
  
 Codes Comments Abdominal pain, generalized    -  Primary ICD-10-CM: R10.84 ICD-9-CM: 789.07 Right flank pain     ICD-10-CM: R10.9 ICD-9-CM: 789.09 Abnormal MRI of abdomen     ICD-10-CM: R93.5 ICD-9-CM: 793.6 Medication reaction, initial encounter     ICD-10-CM: T88. 7XXA ICD-9-CM: E947.9 Benign colon polyp     ICD-10-CM: K63.5 ICD-9-CM: 211.3 Need for hepatitis B screening test     ICD-10-CM: Z11.59 
ICD-9-CM: V73.89 Need for hepatitis C screening test     ICD-10-CM: Z11.59 
ICD-9-CM: V73.89 Vitals BP Pulse Temp Resp Height(growth percentile) Weight(growth percentile) 133/78 (BP 1 Location: Left arm, BP Patient Position: Sitting) 79 97.8 °F (36.6 °C) (Oral) 16 5' 2\" (1.575 m) 142 lb 3.2 oz (64.5 kg) SpO2 BMI OB Status Smoking Status 98% 26.01 kg/m2 Postmenopausal Former Smoker Vitals History BMI and BSA Data Body Mass Index Body Surface Area 26.01 kg/m 2 1.68 m 2 Preferred Pharmacy Pharmacy Name Phone [facility]ACY [facility][id number] [address] AT [address] [place] [address] [phone] Your Updated Medication List  
  
   
This list is accurate as of: 8/3/17 12:38 PM.  Always use your most recent med list. amLODIPine 10 mg tablet Commonly known as:  Rizzo Inks TAKE 1 TABLET BY MOUTH EVERY DAY  
  
 cetirizine 10 mg tablet Commonly known as:  ZYRTEC Take  by mouth.  
  
 cloNIDine HCl 0.1 mg tablet Commonly known as:  CATAPRES Take 1 Tab by mouth nightly. Indications: VASOMOTOR SYMPTOMS ASSOCIATED WITH MENOPAUSE HYDROcodone-ibuprofen 7.5-200 mg per tablet Commonly known as:  Cesar Shave Take  by mouth.  
  
 levalbuterol tartrate 45 mcg/actuation inhaler Commonly known as:  Mel Maria Elena Take 2 Puffs by inhalation every four (4) hours as needed for Wheezing. LORazepam 1 mg tablet Commonly known as:  ATIVAN Start with a 1/2 tab use up to TID as needed  
  
 lovastatin 10 mg tablet Commonly known as:  MEVACOR Take 2 Tabs by mouth nightly. Dose increased 2/3/17 did not send in a new rx  
  
 meloxicam 15 mg tablet Commonly known as:  MOBIC  
  
 methocarbamol 750 mg tablet Commonly known as:  ROBAXIN  
  
 mometasone 50 mcg/actuation nasal spray Commonly known as:  NASONEX  
2 Sprays by Both Nostrils route daily. omeprazole 20 mg capsule Commonly known as:  PRILOSEC  
TAKE ONE CAPSULE BY MOUTH EVERY DAY  
  
 rosuvastatin 5 mg tablet Commonly known as:  CRESTOR  
TAKE ONE TABLET BY MOUTH EVERY NIGHT AT BEDTIME *PA STEP THERAPY SENT 06/01*  
  
 traMADol 50 mg tablet Commonly known as:  ULTRAM  
TAKE 1 TABLET BY MOUTH EVERY 6 HOURS AS NEEDED FOR PAIN  
  
 VITAMIN D2 50,000 unit capsule Generic drug:  ergocalciferol Take 50,000 Units by mouth. 2 capsules once a week VITAMIN D3 5,000 unit Tab tablet Generic drug:  cholecalciferol (VITAMIN D3) Take  by mouth daily. We Performed the Following AMB POC URINALYSIS DIP STICK AUTO W/O MICRO [65969 CPT(R)] CBC WITH AUTOMATED DIFF [08930 CPT(R)] CK C8997702 CPT(R)] HEPATITIS PANEL, ACUTE [92036 CPT(R)] METABOLIC PANEL, COMPREHENSIVE [75756 CPT(R)] Follow-up Instructions Return in about 3 weeks (around 2017) for after mri 15 min review test results and recheck bp 15 min . To-Do List   
 2017 Imaging:  MRI ABD W WO CONT   
  
 2017 Imaging:  MRI PELV W WO CONT Patient Instructions MyChart Activation Thank you for requesting access to CereSoft. Please follow the instructions below to securely access and download your online medical record. CereSoft allows you to send messages to your doctor, view your test results, renew your prescriptions, schedule appointments, and more. How Do I Sign Up? 1. In your internet browser, go to [email or web address] 
2. Click on the First Time User? Click Here link in the Sign In box. You will be redirect to the New Member Sign Up page. 3. Enter your CereSoft Access Code exactly as it appears below. You will not need to use this code after youve completed the sign-up process. If you do not sign up before the expiration date, you must request a new code. CereSoft Access Code: [email or web address] Expires: 2017  9:27 AM (This is the date your CereSoft access code will ) 4. Enter the last four digits of your Social Security Number (xxxx) and Date of Birth (mm/dd/yyyy) as indicated and click Submit. You will be taken to the next sign-up page. 5. Create a CereSoft ID. This will be your CereSoft login ID and cannot be changed, so think of one that is secure and easy to remember. 6. Create a CereSoft password. You can change your password at any time. 7. Enter your Password Reset Question and Answer. This can be used at a later time if you forget your password. 8. Enter your e-mail address. You will receive e-mail notification when new information is available in [address] 9. Click Sign Up. You can now view and download portions of your medical record. 10. Click the Download Summary menu link to download a portable copy of your medical information. Additional Information If you have questions, please visit the Frequently Asked Questions section of the [employer] website at [email or web address] Remember, [employer] is NOT to be used for urgent needs. For medical emergencies, dial 911. Introducing [facility] & HEALTH SERVICES! Russel Quarles introduces I[employer] patient portal. Now you can access parts of your medical record, email your doctor's office, and request medication refills online. 1. In your internet browser, go to [email or web address] 2. Click on the First Time User? Click Here link in the Sign In box. You will see the New Member Sign Up page. 3. Enter your [employer] Access Code exactly as it appears below. You will not need to use this code after youve completed the sign-up process. If you do not sign up before the expiration date, you must request a new code. · [employer] Access Code: [email or web address] Expires: 8/14/2017  9:27 AM 
 
4. Enter the last four digits of your Social Security Number (xxxx) and Date of Birth (mm/dd/yyyy) as indicated and click Submit. You will be taken to the next sign-up page. 5. Create a I2 TELE[employer] INTERNATIONA ID. This will be your [employer] INTER[employer] login ID and cannot be changed, so think of one that is secure and easy to remember. 6. Create a I2 TE[employer] INTERNATIONA password. You can change your password at any time. 7. Enter your Password Reset Question and Answer. This can be used at a later time if you forget your password. 8. Enter your e-mail address.  You will receive e-mail notification when new information is available in ZeroPoint Clean Tech. 9. Click Sign Up. You can now view and download portions of your medical record. 10. Click the Download Summary menu link to download a portable copy of your medical information. If you have questions, please visit the Frequently Asked Questions section of the ZeroPoint Clean Tech website. Remember, ZeroPoint [employer] is NOT to be used for urgent needs. For medical emergencies, dial 911. Now available from your iPhone and Android! Please provide this summary of care documentation to your next provider. Your primary care clinician is listed as Danielle. If you have any questions after today's visit, please call [phone].

## 2017-08-03 NOTE — PROGRESS NOTES
Chief Complaint   Patient presents with    Nausea    Abdominal Pain     dx with diverticulitis in January after testing at [facility]     1. Have you been to the ER, urgent care clinic since your last visit? Hospitalized since your last visit? No    2. Have you seen or consulted any other health care providers outside of the [address] [place] [address] since your last visit? Include any pap smears or colon screening.  No

## 2017-08-03 NOTE — PATIENT INSTRUCTIONS
Ondango Activation    Thank you for requesting access to Ondango. Please follow the instructions below to securely access and download your online medical record. Ondango allows you to send messages to your doctor, view your test results, renew your prescriptions, schedule appointments, and more. How Do I Sign Up? 1. In your internet browser, go to [email or web address]  2. Click on the First Time User? Click Here link in the Sign In box. You will be redirect to the New Member Sign Up page. 3. Enter your Ondango Access Code exactly as it appears below. You will not need to use this code after youve completed the sign-up process. If you do not sign up before the expiration date, you must request a new code. Ondango Access Code: [email or web address]  Expires: 2017  9:27 AM (This is the date your Ondango access code will )    4. Enter the last four digits of your Social Security Number (xxxx) and Date of Birth (mm/dd/yyyy) as indicated and click Submit. You will be taken to the next sign-up page. 5. Create a Ondango ID. This will be your Ondango login ID and cannot be changed, so think of one that is secure and easy to remember. 6. Create a Ondango password. You can change your password at any time. 7. Enter your Password Reset Question and Answer. This can be used at a later time if you forget your password. 8. Enter your e-mail address. You will receive e-mail notification when new information is available in [address]. 9. Click Sign Up. You can now view and download portions of your medical record. 10. Click the Download Summary menu link to download a portable copy of your medical information. Additional Information    If you have questions, please visit the Frequently Asked Questions section of the Ondango website at [email or web address] Remember, Ondango is NOT to be used for urgent needs. For medical emergencies, dial 911.

## 2017-08-03 NOTE — PROGRESS NOTES
Chief Complaint   Patient presents with    Nausea    Abdominal Pain     dx with diverticulitis in January after testing [facility]       Subjective:   Donato Hill 63 y.o.  female with a  past medical history reviewed see below. comes in today c/o: Pain in side right  Is started in [place] and it comes and goes worse with eating a drinking food s  Eating and not eating leads to nasusea and sweating with eating no left sided pain now but has some mild nausea and bloating all over   And with taking the stating she has bloating and feels like she has an allergic reaction  the pain radiates down her back on the right med's taken for the pain tramadol   She was seen by pain management. She has had a egd and coloscopy in march/april of 2017. H/o diverticultiis at [facility]. She stopped her bp and chol med three days ago     ROS: otherwise feeling generally well. All other systems reviewed and are negative      Current Outpatient Prescriptions   Medication Sig Dispense Refill    rosuvastatin (CRESTOR) 5 mg tablet TAKE ONE TABLET BY MOUTH EVERY NIGHT AT BEDTIME *PA STEP THERAPY SENT 06/01*  3    traMADol (ULTRAM) 50 mg tablet TAKE 1 TABLET BY MOUTH EVERY 6 HOURS AS NEEDED FOR PAIN  2    omeprazole (PRILOSEC) 20 mg capsule TAKE ONE CAPSULE BY MOUTH EVERY DAY  0    levalbuterol tartrate (XOPENEX) 45 mcg/actuation inhaler Take 2 Puffs by inhalation every four (4) hours as needed for Wheezing. 1 Inhaler 0    cetirizine (ZYRTEC) 10 mg tablet Take  by mouth.  amLODIPine (NORVASC) 10 mg tablet TAKE 1 TABLET BY MOUTH EVERY DAY 30 Tab 2    lovastatin (MEVACOR) 10 mg tablet Take 2 Tabs by mouth nightly. Dose increased 2/3/17 did not send in a new rx 30 Tab 3    LORazepam (ATIVAN) 1 mg tablet Start with a 1/2 tab use up to TID as needed 14 Tab 0    cloNIDine HCl (CATAPRES) 0.1 mg tablet Take 1 Tab by mouth nightly.  Indications: VASOMOTOR SYMPTOMS ASSOCIATED WITH MENOPAUSE 30 Tab 3    cholecalciferol, VITAMIN D3, (VITAMIN D3) 5,000 unit tab tablet Take  by mouth daily.  mometasone (NASONEX) 50 mcg/actuation nasal spray 2 Sprays by Both Nostrils route daily. 17 Container 0    meloxicam (MOBIC) 15 mg tablet   5    methocarbamol (ROBAXIN) 750 mg tablet   3    VITAMIN D2 50,000 unit capsule Take 50,000 Units by mouth. 2 capsules once a week  1    HYDROcodone-ibuprofen (VICOPROFEN) 7.5-200 mg per tablet Take  by mouth.        Allergies   Allergen Reactions    Atorvastatin Other (comments)     Severe myalgias     Livalo [Pitavastatin] Rash     All over body throat felt full    Mobic [Meloxicam] Nausea and Vomiting     Dizziness      Past Medical History:   Diagnosis Date    Hypercholesterolemia     Hypertension     Sciatica     TMJ (dislocation of temporomandibular joint)      Past Surgical History:   Procedure Laterality Date    HX COLONOSCOPY  2011    7 yr h/o plyps      Family History   Problem Relation Age of Onset   [facility] Stroke Mother     Hypertension Mother    [facility] Elevated Lipids Father     Hypertension Father     Heart Disease Father 48    Hypertension Brother      Social History   Substance Use Topics    Smoking status: Former Smoker     Quit date: 12/28/2016    Smokeless tobacco: Never Used    Alcohol use Yes      Comment: rarely          Objective:     Visit Vitals    /78 (BP 1 Location: Left arm, BP Patient Position: Sitting)    Pulse 79    Temp 97.8 °F (36.6 °C) (Oral)    Resp 16    Ht 5' 2\" (1.575 m)    Wt 142 lb 3.2 oz (64.5 kg)    SpO2 98%    BMI 26.01 kg/m2     Gen: NAD, pleasant  HEENT: normal appearing head, nares patent, PERRLA, EOMI, oropharynx no erythema, no cervical lymphadenopathy neck supple   Cardio: RRR nl S1S2 no murmur  Lungs CTAB no wheeze no rales no rhonchi  ABD Soft mild right flank tenderness unchanged no guarding no rebounding  non distended + bowel sounds  Extremities: full ROM X 4 no clubbing no cyanosis  Neuro: no gross focal deficits noted, alert and orientated X 3  Psych.: well groomed no outward signs of depression. Assessment/Plan:   Diagnoses and all orders for this visit:    1. Abdominal pain, generalized  -     AMB POC URINALYSIS DIP STICK AUTO W/O MICRO  -     MRI ABD W WO CONT; Future  -     MRI PELV W WO CONT; Future  -     CK  -     METABOLIC PANEL, COMPREHENSIVE    2. Right flank pain  -     AMB POC URINALYSIS DIP STICK AUTO W/O MICRO  -     MRI ABD W WO CONT; Future  -     MRI PELV W WO CONT; Future  -     CK  -     METABOLIC PANEL, COMPREHENSIVE  -     CBC WITH AUTOMATED DIFF    3. Abnormal MRI of abdomen  -     AMB POC URINALYSIS DIP STICK AUTO W/O MICRO  -     MRI ABD W WO CONT; Future  -     MRI PELV W WO CONT; Future  -     CK  -     METABOLIC PANEL, COMPREHENSIVE    4. Medication reaction, initial encounter    5. Benign colon polyp    6. Need for hepatitis B screening test  -     HEPATITIS PANEL, ACUTE    7. Need for hepatitis C screening test  -     HEPATITIS PANEL, ACUTE      Follow-up Disposition:  Return in about 3 weeks (around 8/24/2017) for after mri 15 min review test results and recheck bp 15 min . Arbutus Ring avs printed and given to the pt. .  Hold bp meds and chol meds suspect kidney stone, declined dilaudid as she had a Neeraj Guinean today for pain The patient voiced understanding of the above. Medication side effects were reviewed with the patient. Call with any concerns.

## 2017-08-04 LAB
ALBUMIN SERPL-MCNC: 4.5 G/DL (ref 3.5–5.5)
ALBUMIN/GLOB SERPL: 1.3 {RATIO} (ref 1.2–2.2)
ALP SERPL-CCNC: 118 IU/L (ref 39–117)
ALT SERPL-CCNC: 13 IU/L (ref 0–32)
AST SERPL-CCNC: 20 IU/L (ref 0–40)
BASOPHILS # BLD AUTO: 0 X10E3/UL (ref 0–0.2)
BASOPHILS NFR BLD AUTO: 0 %
BILIRUB SERPL-MCNC: 0.4 MG/DL (ref 0–1.2)
BUN SERPL-MCNC: 10 MG/DL (ref 6–24)
BUN/CREAT SERPL: 15 (ref 9–23)
CALCIUM SERPL-MCNC: 9.4 MG/DL (ref 8.7–10.2)
CHLORIDE SERPL-SCNC: 99 MMOL/L (ref 96–106)
CK SERPL-CCNC: 93 U/L (ref 24–173)
CO2 SERPL-SCNC: 28 MMOL/L (ref 18–29)
CREAT SERPL-MCNC: 0.67 MG/DL (ref 0.57–1)
EOSINOPHIL # BLD AUTO: 0.1 X10E3/UL (ref 0–0.4)
EOSINOPHIL NFR BLD AUTO: 1 %
ERYTHROCYTE [DISTWIDTH] IN BLOOD BY AUTOMATED COUNT: 16 % (ref 12.3–15.4)
GLOBULIN SER CALC-MCNC: 3.5 G/DL (ref 1.5–4.5)
GLUCOSE SERPL-MCNC: 93 MG/DL (ref 65–99)
HAV IGM SERPL QL IA: NEGATIVE
HBV CORE IGM SERPL QL IA: NEGATIVE
HBV SURFACE AG SERPL QL IA: NEGATIVE
HCT VFR BLD AUTO: 43.3 % (ref 34–46.6)
HCV AB S/CO SERPL IA: <0.1 S/CO RATIO (ref 0–0.9)
HGB BLD-MCNC: 13.6 G/DL (ref 11.1–15.9)
IMM GRANULOCYTES # BLD: 0 X10E3/UL (ref 0–0.1)
IMM GRANULOCYTES NFR BLD: 0 %
LYMPHOCYTES # BLD AUTO: 1.9 X10E3/UL (ref 0.7–3.1)
LYMPHOCYTES NFR BLD AUTO: 26 %
MCH RBC QN AUTO: 23.5 PG (ref 26.6–33)
MCHC RBC AUTO-ENTMCNC: 31.4 G/DL (ref 31.5–35.7)
MCV RBC AUTO: 75 FL (ref 79–97)
MONOCYTES # BLD AUTO: 0.7 X10E3/UL (ref 0.1–0.9)
MONOCYTES NFR BLD AUTO: 9 %
NEUTROPHILS # BLD AUTO: 4.7 X10E3/UL (ref 1.4–7)
NEUTROPHILS NFR BLD AUTO: 64 %
PLATELET # BLD AUTO: 311 X10E3/UL (ref 150–379)
POTASSIUM SERPL-SCNC: 3.8 MMOL/L (ref 3.5–5.2)
PROT SERPL-MCNC: 8 G/DL (ref 6–8.5)
RBC # BLD AUTO: 5.79 X10E6/UL (ref 3.77–5.28)
SODIUM SERPL-SCNC: 142 MMOL/L (ref 134–144)
WBC # BLD AUTO: 7.4 X10E3/UL (ref 3.4–10.8)

## 2017-08-04 NOTE — PROGRESS NOTES
Dept of Radiology MRI form and orders were faxed to [facility] at fax# [phone] on 08/03/17. Their scheduling dept will contact pt.

## 2017-08-24 ENCOUNTER — OFFICE VISIT (OUTPATIENT)
Dept: INTERNAL MEDICINE CLINIC | Age: 62
End: 2017-08-24

## 2017-08-24 VITALS
HEIGHT: 62 IN | OXYGEN SATURATION: 98 % | SYSTOLIC BLOOD PRESSURE: 130 MMHG | DIASTOLIC BLOOD PRESSURE: 75 MMHG | BODY MASS INDEX: 26.07 KG/M2 | TEMPERATURE: 98.3 F | HEART RATE: 81 BPM | RESPIRATION RATE: 16 BRPM | WEIGHT: 141.7 LBS

## 2017-08-24 DIAGNOSIS — R74.8 ALKALINE PHOSPHATASE ELEVATION: ICD-10-CM

## 2017-08-24 DIAGNOSIS — R10.11 RIGHT UPPER QUADRANT ABDOMINAL PAIN: ICD-10-CM

## 2017-08-24 DIAGNOSIS — R11.0 NAUSEA ALONE: ICD-10-CM

## 2017-08-24 DIAGNOSIS — R19.5 CHANGE IN STOOL CALIBER: ICD-10-CM

## 2017-08-24 DIAGNOSIS — R07.0 THROAT PAIN: ICD-10-CM

## 2017-08-24 DIAGNOSIS — Z71.2 ENCOUNTER TO DISCUSS TEST RESULTS: Primary | ICD-10-CM

## 2017-08-24 NOTE — PROGRESS NOTES
No chief complaint on file. 1. Have you been to the ER, urgent care clinic since your last visit? Hospitalized since your last visit? No    2. Have you seen or consulted any other health care providers outside of the [facility] since your last visit? Include any pap smears or colon screening.  No

## 2017-08-24 NOTE — PROGRESS NOTES
Chief Complaint   Patient presents with    Medication Refill    Cholesterol Problem    Results           Subjective:   Maggie Navarro 63 y.o.  female with a  past medical history reviewed see below. comes in today for medication refill and for test results still having abd pain and has not been able to eat well only eating bread and meat and apples was not able to get the mri due to insurance still has pain in the right side and radiates. Pt had relief of nausea and throat pain while on abx but ha been nslueated for > 1 yr and sore thorat is about the same it comes and goes an she has had a change in stool caliber _ribbion like     ROS: otherwise feeling generally well. All other systems reviewed and are negative      Current Outpatient Prescriptions   Medication Sig Dispense Refill    rosuvastatin (CRESTOR) 5 mg tablet TAKE ONE TABLET BY MOUTH EVERY NIGHT AT BEDTIME *PA STEP THERAPY SENT 06/01*  3    traMADol (ULTRAM) 50 mg tablet TAKE 1 TABLET BY MOUTH EVERY 6 HOURS AS NEEDED FOR PAIN  2    omeprazole (PRILOSEC) 20 mg capsule TAKE ONE CAPSULE BY MOUTH EVERY DAY  0    amLODIPine (NORVASC) 10 mg tablet TAKE 1 TABLET BY MOUTH EVERY DAY 30 Tab 2    lovastatin (MEVACOR) 10 mg tablet Take 2 Tabs by mouth nightly. Dose increased 2/3/17 did not send in a new rx 30 Tab 3    levalbuterol tartrate (XOPENEX) 45 mcg/actuation inhaler Take 2 Puffs by inhalation every four (4) hours as needed for Wheezing. 1 Inhaler 0    LORazepam (ATIVAN) 1 mg tablet Start with a 1/2 tab use up to TID as needed 14 Tab 0    cloNIDine HCl (CATAPRES) 0.1 mg tablet Take 1 Tab by mouth nightly. Indications: VASOMOTOR SYMPTOMS ASSOCIATED WITH MENOPAUSE 30 Tab 3    cholecalciferol, VITAMIN D3, (VITAMIN D3) 5,000 unit tab tablet Take  by mouth daily.  meloxicam (MOBIC) 15 mg tablet   5    methocarbamol (ROBAXIN) 750 mg tablet   3    VITAMIN D2 50,000 unit capsule Take 50,000 Units by mouth.  2 capsules once a week  1    HYDROcodone-ibuprofen (VICOPROFEN) 7.5-200 mg per tablet Take  by mouth.  cetirizine (ZYRTEC) 10 mg tablet Take  by mouth.  mometasone (NASONEX) 50 mcg/actuation nasal spray 2 Sprays by Both Nostrils route daily. 17 Container 0     Allergies   Allergen Reactions    Atorvastatin Other (comments)     Severe myalgias     Livalo [Pitavastatin] Rash     All over body throat felt full    Mobic [Meloxicam] Nausea and Vomiting     Dizziness      Past Medical History:   Diagnosis Date    Hypercholesterolemia     Hypertension     Sciatica     TMJ (dislocation of temporomandibular joint)      Past Surgical History:   Procedure Laterality Date    HX COLONOSCOPY  2011    7 yr h/o plyps      Family History   Problem Relation Age of Onset   [facility] St[facility] Mother     Hypertension Mother    [facility] Elevated Lipids Father     Hypertension Father     Heart Disease Father 48    Hypertension Brother      Social History   Substance Use Topics    Smoking status: Former Smoker     Quit date: 12/28/2016    Smokeless tobacco: Never Used    Alcohol use Yes      Comment: rarely          Objective:     Visit Vitals    /75 (BP 1 Location: Right arm, BP Patient Position: Sitting)    Pulse 81    Temp 98.3 °F (36.8 °C) (Oral)    Resp 16    Ht 5' 2\" (1.575 m)    Wt 141 lb 11.2 oz (64.3 kg)    SpO2 98%    BMI 25.92 kg/m2     Gen: NAD, pleasant  HEENT: normal appearing head, nares patent, PERRLA, EOMI, oropharynx no erythema, no cervical lymphadenopathy neck supple   Cardio: RRR nl S1S2 no murmur  Lungs CTAB no wheeze no rales no rhonchi  ABD Soft  non distended + bowel sounds right sided abd mild + tenderness flank no guarding no rebounding no hsm  Extremities: full ROM X 4 no clubbing no cyanosis  Neuro: no gross focal deficits noted, alert and orientated X 3  Psych.: well groomed no outward signs of depression. Assessment/Plan:   Diagnoses and all orders for this visit:    1. Encounter to discuss test results    2. Alkaline phosphatase elevation  -     REFERRAL TO GASTROENTEROLOGY    3. Nausea alone  -     REFERRAL TO GASTROENTEROLOGY  -     H PYLORI AB, IGA    4. Throat pain    5. Right upper quadrant abdominal pain  -     REFERRAL TO GASTROENTEROLOGY    6. Change in stool caliber- ribbon like  -     REFERRAL TO GASTROENTEROLOGY      Follow-up Disposition: Not on File. d/w pt need to repeat alk phos in 4-6 months and check isoenzymes sooner if pain in abd increases. avs printed and given to the pt. .    The patient voiced understanding of the above. Medication side effects were reviewed with the patient. Call with any concerns.

## 2017-09-22 ENCOUNTER — HOSPITAL ENCOUNTER (OUTPATIENT)
Dept: ULTRASOUND IMAGING | Age: 62
Discharge: HOME | End: 2017-09-22
Attending: INTERNAL MEDICINE
Payer: COMMERCIAL

## 2017-09-22 DIAGNOSIS — R11.0 NAUSEA: ICD-10-CM

## 2017-09-22 DIAGNOSIS — R10.11 RIGHT UPPER QUADRANT PAIN: ICD-10-CM

## 2017-09-22 DIAGNOSIS — R13.10 DYSPHAGIA: ICD-10-CM

## 2017-09-22 DIAGNOSIS — R10.9 ABDOMINAL PAIN: ICD-10-CM

## 2017-09-22 PROCEDURE — 76700 US EXAM ABDOM COMPLETE: CPT
